# Patient Record
Sex: FEMALE | Race: WHITE | NOT HISPANIC OR LATINO | Employment: STUDENT | URBAN - METROPOLITAN AREA
[De-identification: names, ages, dates, MRNs, and addresses within clinical notes are randomized per-mention and may not be internally consistent; named-entity substitution may affect disease eponyms.]

---
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
## 2021-06-17 ENCOUNTER — PATIENT MESSAGE (OUTPATIENT)
Dept: PEDIATRIC CARDIOLOGY | Facility: CLINIC | Age: 21
End: 2021-06-17

## 2021-06-17 DIAGNOSIS — Q21.3 TETRALOGY OF FALLOT: Primary | ICD-10-CM

## 2021-07-26 ENCOUNTER — HOSPITAL ENCOUNTER (OUTPATIENT)
Dept: PEDIATRIC CARDIOLOGY | Facility: HOSPITAL | Age: 21
Discharge: HOME | End: 2021-07-26
Attending: PEDIATRICS
Payer: COMMERCIAL

## 2021-07-26 ENCOUNTER — CLINICAL SUPPORT (OUTPATIENT)
Dept: PEDIATRIC CARDIOLOGY | Facility: CLINIC | Age: 21
End: 2021-07-26
Payer: COMMERCIAL

## 2021-07-26 ENCOUNTER — OFFICE VISIT (OUTPATIENT)
Dept: PEDIATRIC CARDIOLOGY | Facility: CLINIC | Age: 21
End: 2021-07-26
Payer: COMMERCIAL

## 2021-07-26 VITALS
DIASTOLIC BLOOD PRESSURE: 66 MMHG | HEART RATE: 69 BPM | OXYGEN SATURATION: 98 % | BODY MASS INDEX: 22.68 KG/M2 | SYSTOLIC BLOOD PRESSURE: 132 MMHG | HEIGHT: 60 IN | WEIGHT: 115.5 LBS

## 2021-07-26 DIAGNOSIS — Z95.2 S/P PULMONARY VALVE REPLACEMENT: ICD-10-CM

## 2021-07-26 DIAGNOSIS — Q21.3 TETRALOGY OF FALLOT: ICD-10-CM

## 2021-07-26 DIAGNOSIS — I45.10 RBBB: Primary | ICD-10-CM

## 2021-07-26 DIAGNOSIS — Z87.74 S/P TOF (TETRALOGY OF FALLOT) REPAIR: ICD-10-CM

## 2021-07-26 PROCEDURE — 93320 PR DOPPLER ECHO HEART,COMPLETE: ICD-10-PCS | Mod: 26,,, | Performed by: PEDIATRICS

## 2021-07-26 PROCEDURE — 93303 PR ECHO XTHORACIC,CONG A2M,COMPLETE: ICD-10-PCS | Mod: 26,,, | Performed by: PEDIATRICS

## 2021-07-26 PROCEDURE — 1126F AMNT PAIN NOTED NONE PRSNT: CPT | Mod: CPTII,S$GLB,, | Performed by: PEDIATRICS

## 2021-07-26 PROCEDURE — 93242 EXT ECG>48HR<7D RECORDING: CPT

## 2021-07-26 PROCEDURE — 99215 PR OFFICE/OUTPT VISIT, EST, LEVL V, 40-54 MIN: ICD-10-PCS | Mod: 25,S$GLB,, | Performed by: PEDIATRICS

## 2021-07-26 PROCEDURE — 93325 PR DOPPLER COLOR FLOW VELOCITY MAP: ICD-10-PCS | Mod: 26,,, | Performed by: PEDIATRICS

## 2021-07-26 PROCEDURE — 1159F PR MEDICATION LIST DOCUMENTED IN MEDICAL RECORD: ICD-10-PCS | Mod: CPTII,S$GLB,, | Performed by: PEDIATRICS

## 2021-07-26 PROCEDURE — 93000 EKG 12-LEAD PEDIATRIC: ICD-10-PCS | Mod: S$GLB,,, | Performed by: PEDIATRICS

## 2021-07-26 PROCEDURE — 93000 ELECTROCARDIOGRAM COMPLETE: CPT | Mod: S$GLB,,, | Performed by: PEDIATRICS

## 2021-07-26 PROCEDURE — 93325 DOPPLER ECHO COLOR FLOW MAPG: CPT | Mod: 26,,, | Performed by: PEDIATRICS

## 2021-07-26 PROCEDURE — 93320 DOPPLER ECHO COMPLETE: CPT | Mod: 26,,, | Performed by: PEDIATRICS

## 2021-07-26 PROCEDURE — 1126F PR PAIN SEVERITY QUANTIFIED, NO PAIN PRESENT: ICD-10-PCS | Mod: CPTII,S$GLB,, | Performed by: PEDIATRICS

## 2021-07-26 PROCEDURE — 93303 ECHO TRANSTHORACIC: CPT | Mod: 26,,, | Performed by: PEDIATRICS

## 2021-07-26 PROCEDURE — 93244 CV 3-14 DAY PEDIATRIC HOLTER MONITOR (CUPID ONLY): ICD-10-PCS | Mod: ,,, | Performed by: PEDIATRICS

## 2021-07-26 PROCEDURE — 1159F MED LIST DOCD IN RCRD: CPT | Mod: CPTII,S$GLB,, | Performed by: PEDIATRICS

## 2021-07-26 PROCEDURE — 99999 PR PBB SHADOW E&M-EST. PATIENT-LVL III: ICD-10-PCS | Mod: PBBFAC,,, | Performed by: PEDIATRICS

## 2021-07-26 PROCEDURE — 93244 EXT ECG>48HR<7D REV&INTERPJ: CPT | Mod: ,,, | Performed by: PEDIATRICS

## 2021-07-26 PROCEDURE — 99999 PR PBB SHADOW E&M-EST. PATIENT-LVL III: CPT | Mod: PBBFAC,,, | Performed by: PEDIATRICS

## 2021-07-26 PROCEDURE — 3008F PR BODY MASS INDEX (BMI) DOCUMENTED: ICD-10-PCS | Mod: CPTII,S$GLB,, | Performed by: PEDIATRICS

## 2021-07-26 PROCEDURE — 1160F PR REVIEW ALL MEDS BY PRESCRIBER/CLIN PHARMACIST DOCUMENTED: ICD-10-PCS | Mod: CPTII,S$GLB,, | Performed by: PEDIATRICS

## 2021-07-26 PROCEDURE — 3008F BODY MASS INDEX DOCD: CPT | Mod: CPTII,S$GLB,, | Performed by: PEDIATRICS

## 2021-07-26 PROCEDURE — 99215 OFFICE O/P EST HI 40 MIN: CPT | Mod: 25,S$GLB,, | Performed by: PEDIATRICS

## 2021-07-26 PROCEDURE — 1160F RVW MEDS BY RX/DR IN RCRD: CPT | Mod: CPTII,S$GLB,, | Performed by: PEDIATRICS

## 2021-07-26 RX ORDER — CEFUROXIME AXETIL 500 MG/1
1 TABLET ORAL 2 TIMES DAILY
COMMUNITY
Start: 2021-07-21 | End: 2021-07-31

## 2022-06-07 ENCOUNTER — TELEPHONE (OUTPATIENT)
Dept: PEDIATRIC CARDIOLOGY | Facility: CLINIC | Age: 22
End: 2022-06-07
Payer: COMMERCIAL

## 2022-06-07 DIAGNOSIS — Z87.74 S/P TOF (TETRALOGY OF FALLOT) REPAIR: Primary | ICD-10-CM

## 2022-06-07 DIAGNOSIS — Z95.2 S/P PULMONARY VALVE REPLACEMENT: ICD-10-CM

## 2022-06-07 NOTE — TELEPHONE ENCOUNTER
----- Message from Maribeth Simms sent at 6/7/2022 10:19 AM CDT -----  Contact: [phone]    Caller: Mom    Reason : She is requesting a call back from the nurse to get assistance with  scheduling an     appointment.    Comments: Please call mom back to advise.

## 2022-06-07 NOTE — TELEPHONE ENCOUNTER
Spoke to mother regarding scheduling appointments for yearly check up. Mother in agreement for appointments on Monday July 13, starting at 10:15A. Appointment slip to be mailed to address on file.

## 2022-07-18 ENCOUNTER — HOSPITAL ENCOUNTER (OUTPATIENT)
Dept: PEDIATRIC CARDIOLOGY | Facility: HOSPITAL | Age: 22
Discharge: HOME | End: 2022-07-18
Attending: PEDIATRICS
Payer: COMMERCIAL

## 2022-07-18 ENCOUNTER — CLINICAL SUPPORT (OUTPATIENT)
Dept: PEDIATRIC CARDIOLOGY | Facility: CLINIC | Age: 22
End: 2022-07-18
Payer: COMMERCIAL

## 2022-07-18 ENCOUNTER — OFFICE VISIT (OUTPATIENT)
Dept: PEDIATRIC CARDIOLOGY | Facility: CLINIC | Age: 22
End: 2022-07-18
Payer: COMMERCIAL

## 2022-07-18 VITALS
WEIGHT: 123.44 LBS | DIASTOLIC BLOOD PRESSURE: 72 MMHG | OXYGEN SATURATION: 98 % | SYSTOLIC BLOOD PRESSURE: 126 MMHG | HEART RATE: 77 BPM | BODY MASS INDEX: 24.88 KG/M2 | HEIGHT: 59 IN

## 2022-07-18 DIAGNOSIS — Z87.74 S/P TOF (TETRALOGY OF FALLOT) REPAIR: ICD-10-CM

## 2022-07-18 DIAGNOSIS — Z95.2 S/P PULMONARY VALVE REPLACEMENT: ICD-10-CM

## 2022-07-18 DIAGNOSIS — I45.10 RBBB: ICD-10-CM

## 2022-07-18 DIAGNOSIS — Z98.890 STATUS POST PULMONARY ARTERY BRANCHES STENT PLACEMENT: ICD-10-CM

## 2022-07-18 DIAGNOSIS — Z87.74 S/P TOF (TETRALOGY OF FALLOT) REPAIR: Primary | ICD-10-CM

## 2022-07-18 PROBLEM — R53.81 PHYSICAL DECONDITIONING: Status: RESOLVED | Noted: 2018-05-08 | Resolved: 2022-07-18

## 2022-07-18 PROCEDURE — 93244 CV 3-14 DAY PEDIATRIC HOLTER MONITOR (CUPID ONLY): ICD-10-PCS | Mod: ,,, | Performed by: PEDIATRICS

## 2022-07-18 PROCEDURE — 93320 PEDIATRIC ECHO (CUPID ONLY): ICD-10-PCS | Mod: 26,,, | Performed by: PEDIATRICS

## 2022-07-18 PROCEDURE — 93325 DOPPLER ECHO COLOR FLOW MAPG: CPT | Mod: 26,,, | Performed by: PEDIATRICS

## 2022-07-18 PROCEDURE — 1159F MED LIST DOCD IN RCRD: CPT | Mod: CPTII,S$GLB,, | Performed by: PEDIATRICS

## 2022-07-18 PROCEDURE — 93320 DOPPLER ECHO COMPLETE: CPT | Mod: 26,,, | Performed by: PEDIATRICS

## 2022-07-18 PROCEDURE — 99999 PR PBB SHADOW E&M-EST. PATIENT-LVL I: CPT | Mod: PBBFAC,,,

## 2022-07-18 PROCEDURE — 93303 PEDIATRIC ECHO (CUPID ONLY): ICD-10-PCS | Mod: 26,,, | Performed by: PEDIATRICS

## 2022-07-18 PROCEDURE — 93000 ELECTROCARDIOGRAM COMPLETE: CPT | Mod: S$GLB,,, | Performed by: PEDIATRICS

## 2022-07-18 PROCEDURE — 93325 DOPPLER ECHO COLOR FLOW MAPG: CPT

## 2022-07-18 PROCEDURE — 99999 PR PBB SHADOW E&M-EST. PATIENT-LVL III: CPT | Mod: PBBFAC,,, | Performed by: PEDIATRICS

## 2022-07-18 PROCEDURE — 3008F BODY MASS INDEX DOCD: CPT | Mod: CPTII,S$GLB,, | Performed by: PEDIATRICS

## 2022-07-18 PROCEDURE — 93303 ECHO TRANSTHORACIC: CPT | Mod: 26,,, | Performed by: PEDIATRICS

## 2022-07-18 PROCEDURE — 99999 PR PBB SHADOW E&M-EST. PATIENT-LVL III: ICD-10-PCS | Mod: PBBFAC,,, | Performed by: PEDIATRICS

## 2022-07-18 PROCEDURE — 93000 EKG 12-LEAD PEDIATRIC: ICD-10-PCS | Mod: S$GLB,,, | Performed by: PEDIATRICS

## 2022-07-18 PROCEDURE — 3078F DIAST BP <80 MM HG: CPT | Mod: CPTII,S$GLB,, | Performed by: PEDIATRICS

## 2022-07-18 PROCEDURE — 99999 PR PBB SHADOW E&M-EST. PATIENT-LVL I: ICD-10-PCS | Mod: PBBFAC,,,

## 2022-07-18 PROCEDURE — 3078F PR MOST RECENT DIASTOLIC BLOOD PRESSURE < 80 MM HG: ICD-10-PCS | Mod: CPTII,S$GLB,, | Performed by: PEDIATRICS

## 2022-07-18 PROCEDURE — 3074F SYST BP LT 130 MM HG: CPT | Mod: CPTII,S$GLB,, | Performed by: PEDIATRICS

## 2022-07-18 PROCEDURE — 1160F RVW MEDS BY RX/DR IN RCRD: CPT | Mod: CPTII,S$GLB,, | Performed by: PEDIATRICS

## 2022-07-18 PROCEDURE — 93325 PEDIATRIC ECHO (CUPID ONLY): ICD-10-PCS | Mod: 26,,, | Performed by: PEDIATRICS

## 2022-07-18 PROCEDURE — 99215 OFFICE O/P EST HI 40 MIN: CPT | Mod: 25,S$GLB,, | Performed by: PEDIATRICS

## 2022-07-18 PROCEDURE — 3008F PR BODY MASS INDEX (BMI) DOCUMENTED: ICD-10-PCS | Mod: CPTII,S$GLB,, | Performed by: PEDIATRICS

## 2022-07-18 PROCEDURE — 99215 PR OFFICE/OUTPT VISIT, EST, LEVL V, 40-54 MIN: ICD-10-PCS | Mod: 25,S$GLB,, | Performed by: PEDIATRICS

## 2022-07-18 PROCEDURE — 93242 EXT ECG>48HR<7D RECORDING: CPT

## 2022-07-18 PROCEDURE — 3074F PR MOST RECENT SYSTOLIC BLOOD PRESSURE < 130 MM HG: ICD-10-PCS | Mod: CPTII,S$GLB,, | Performed by: PEDIATRICS

## 2022-07-18 PROCEDURE — 1160F PR REVIEW ALL MEDS BY PRESCRIBER/CLIN PHARMACIST DOCUMENTED: ICD-10-PCS | Mod: CPTII,S$GLB,, | Performed by: PEDIATRICS

## 2022-07-18 PROCEDURE — 1159F PR MEDICATION LIST DOCUMENTED IN MEDICAL RECORD: ICD-10-PCS | Mod: CPTII,S$GLB,, | Performed by: PEDIATRICS

## 2022-07-18 PROCEDURE — 93244 EXT ECG>48HR<7D REV&INTERPJ: CPT | Mod: ,,, | Performed by: PEDIATRICS

## 2022-07-18 NOTE — PROGRESS NOTES
Subjective:    Patient ID:  Jeremias Cosme is a 21 y.o. female who presents for follow-up of repaired TOF. She has no cardiac symptoms.     She takes aspirin and OCP daily.    She had a recent influenza B infection. She has been on a cephalosporin antibiotic for 5 days for sinusitis symptoms. She feels well now.    PRIOR CARDIAC INTERVENTIONS:   1. Modified right Miguelangel-Taussig shunt, [facility]NO ([facility])  2. TOF complete repair/ trans-annular patch, [facility] ([facility])  3. Right and left branch pulmonary artery stent placement, [facility] (03/21/2005, 09/13/2004).   4. Pulmonary valve replacement using 23 mm Trifecta bovine pericardial valve, branch pulmonary arterioplasty with homograft, main pulmonary arterioplasty with bovine pericardium, partial removal of bilateral pulmonary stents (July 13, 2011) OF ([facility])  5. Chandrika valve implant 2015 ([facility])      Review of Systems   Constitutional: Negative.   HENT: Negative.    Eyes: Negative.    Cardiovascular: Negative.    Respiratory: Negative.    Endocrine: Negative.    Hematologic/Lymphatic: Negative.    Skin: Negative.    Musculoskeletal: Negative.    Gastrointestinal: Negative.    Genitourinary: Negative.    Neurological: Negative.    Psychiatric/Behavioral: Negative.    Allergic/Immunologic: Negative.         Objective:    Physical Exam  Constitutional:       General: She is not in acute distress.     Appearance: She is well-developed. She is not diaphoretic.   HENT:      Head: Normocephalic and atraumatic.      Right Ear: External ear normal.      Left Ear: External ear normal.      Nose: Nose normal.      Mouth/Throat:      Pharynx: No oropharyngeal exudate.   Eyes:      General: No scleral icterus.        Right eye: No discharge.         Left eye: No discharge.      Conjunctiva/sclera: Conjunctivae normal.      Pupils: Pupils are equal, round, and reactive to light.   Neck:      Thyroid: No thyromegaly.      Vascular: No JVD.      Trachea: No tracheal deviation.    Cardiovascular:      Rate and Rhythm: Normal rate.      Pulses: Intact distal pulses.           Radial pulses are 2+ on the right side and 2+ on the left side.        Femoral pulses are 2+ on the right side and 2+ on the left side.     Heart sounds: S1 normal and S2 normal. Murmur heard.    Medium-pitched harsh early systolic murmur is present with a grade of 2/6 at the upper left sternal border.    No friction rub. No gallop.   Pulmonary:      Effort: Pulmonary effort is normal. No respiratory distress.      Breath sounds: Normal breath sounds. No stridor. No wheezing or rales.   Chest:      Chest wall: No tenderness.   Abdominal:      General: Bowel sounds are normal. There is no distension.      Palpations: Abdomen is soft. There is no mass.      Tenderness: There is no abdominal tenderness. There is no guarding or rebound.   Musculoskeletal:         General: No tenderness. Normal range of motion.      Cervical back: Normal range of motion and neck supple.   Lymphadenopathy:      Cervical: No cervical adenopathy.   Skin:     General: Skin is warm and dry.      Coloration: Skin is not pale.      Findings: No erythema or rash.   Neurological:      Mental Status: She is alert and oriented to person, place, and time.      Cranial Nerves: No cranial nerve deficit.      Motor: No abnormal muscle tone.      Coordination: Coordination normal.   Psychiatric:         Behavior: Behavior normal.         Thought Content: Thought content normal.         Judgment: Judgment normal.           ECG: NSR, RBBB  ECHO: Unchanged. Chandrika functioning well, no insufficiency or significant stenosis. Minimal branch gradient unchanged. Mild RVE an low normal biventricular function unchanged.    Assessment:       1. S/P TOF (tetralogy of Fallot) repair    2. Status post pulmonary artery branches stent placement and partial extraction    3. S/P Chandrika in bioprosthetic pulmonary valve replacement    4. RBBB             Plan:       1. I  reviewed today's findings in detail including the need for regular exercise and for continued follow up, awareness of the risk for endocarditis and the eventual need for pulmonary valve re-intervention when the bioprosthetic valve fails. Anticipatory guidance regarding adult issues including pregnancy planning reviewed.  2. Continue aspirin 81 mg po daily and SBE precaution prn.  3. Treat as normal from a cardiac standpoint. Encouraged exercise.  4. Holter today, phone follow up  5. Recheck in one year with ECG, Holter and echo.

## 2022-07-27 LAB
OHS CV EVENT MONITOR DAY: 1
OHS CV HOLTER HOOKUP DATE: NORMAL
OHS CV HOLTER HOOKUP TIME: NORMAL
OHS CV HOLTER LENGTH DECIMAL HOURS: 25
OHS CV HOLTER LENGTH HOURS: 1
OHS CV HOLTER LENGTH MINUTES: 0
OHS CV HOLTER SCAN DATE: NORMAL
OHS CV HOLTER SINUS AVERAGE HR: 77 BPM
OHS CV HOLTER SINUS MAX HR: 124 BPM
OHS CV HOLTER SINUS MIN HR: 51 BPM
OHS CV HOLTER STUDY END DATE: NORMAL
OHS CV HOLTER STUDY END TIME: NORMAL

## 2023-06-26 DIAGNOSIS — Z95.2 S/P PULMONARY VALVE REPLACEMENT: ICD-10-CM

## 2023-06-26 DIAGNOSIS — Z98.890 STATUS POST PULMONARY ARTERY BRANCHES STENT PLACEMENT: ICD-10-CM

## 2023-06-26 DIAGNOSIS — Z87.74 S/P TOF (TETRALOGY OF FALLOT) REPAIR: Primary | ICD-10-CM

## 2023-07-17 ENCOUNTER — HOSPITAL ENCOUNTER (OUTPATIENT)
Dept: PEDIATRIC CARDIOLOGY | Facility: HOSPITAL | Age: 23
Discharge: HOME | End: 2023-07-17
Attending: PEDIATRICS
Payer: COMMERCIAL

## 2023-07-17 ENCOUNTER — CLINICAL SUPPORT (OUTPATIENT)
Dept: PEDIATRIC CARDIOLOGY | Facility: CLINIC | Age: 23
End: 2023-07-17
Payer: COMMERCIAL

## 2023-07-17 ENCOUNTER — OFFICE VISIT (OUTPATIENT)
Dept: PEDIATRIC CARDIOLOGY | Facility: CLINIC | Age: 23
End: 2023-07-17
Payer: COMMERCIAL

## 2023-07-17 VITALS
HEART RATE: 86 BPM | DIASTOLIC BLOOD PRESSURE: 72 MMHG | SYSTOLIC BLOOD PRESSURE: 133 MMHG | HEIGHT: 60 IN | OXYGEN SATURATION: 98 % | WEIGHT: 128.5 LBS | BODY MASS INDEX: 25.23 KG/M2

## 2023-07-17 DIAGNOSIS — Z95.2 S/P PULMONARY VALVE REPLACEMENT: ICD-10-CM

## 2023-07-17 DIAGNOSIS — Z87.74 S/P TOF (TETRALOGY OF FALLOT) REPAIR: ICD-10-CM

## 2023-07-17 DIAGNOSIS — I45.10 RBBB: ICD-10-CM

## 2023-07-17 DIAGNOSIS — Z98.890 STATUS POST PULMONARY ARTERY BRANCHES STENT PLACEMENT: ICD-10-CM

## 2023-07-17 DIAGNOSIS — Q21.3 TETRALOGY OF FALLOT: Primary | ICD-10-CM

## 2023-07-17 LAB — BSA FOR ECHO PROCEDURE: 1.57 M2

## 2023-07-17 PROCEDURE — 99215 OFFICE O/P EST HI 40 MIN: CPT | Mod: 25,S$GLB,, | Performed by: PEDIATRICS

## 2023-07-17 PROCEDURE — 93325 DOPPLER ECHO COLOR FLOW MAPG: CPT | Mod: 26,,, | Performed by: PEDIATRICS

## 2023-07-17 PROCEDURE — 93303 PEDIATRIC ECHO (CUPID ONLY): ICD-10-PCS | Mod: 26,,, | Performed by: PEDIATRICS

## 2023-07-17 PROCEDURE — 3078F DIAST BP <80 MM HG: CPT | Mod: CPTII,S$GLB,, | Performed by: PEDIATRICS

## 2023-07-17 PROCEDURE — 1160F RVW MEDS BY RX/DR IN RCRD: CPT | Mod: CPTII,S$GLB,, | Performed by: PEDIATRICS

## 2023-07-17 PROCEDURE — 1159F PR MEDICATION LIST DOCUMENTED IN MEDICAL RECORD: ICD-10-PCS | Mod: CPTII,S$GLB,, | Performed by: PEDIATRICS

## 2023-07-17 PROCEDURE — 93244 CV 3-14 DAY PEDIATRIC HOLTER MONITOR (CUPID ONLY): ICD-10-PCS | Mod: ,,, | Performed by: PEDIATRICS

## 2023-07-17 PROCEDURE — 3008F PR BODY MASS INDEX (BMI) DOCUMENTED: ICD-10-PCS | Mod: CPTII,S$GLB,, | Performed by: PEDIATRICS

## 2023-07-17 PROCEDURE — 3075F SYST BP GE 130 - 139MM HG: CPT | Mod: CPTII,S$GLB,, | Performed by: PEDIATRICS

## 2023-07-17 PROCEDURE — 99215 PR OFFICE/OUTPT VISIT, EST, LEVL V, 40-54 MIN: ICD-10-PCS | Mod: 25,S$GLB,, | Performed by: PEDIATRICS

## 2023-07-17 PROCEDURE — 99999 PR PBB SHADOW E&M-EST. PATIENT-LVL III: CPT | Mod: PBBFAC,,, | Performed by: PEDIATRICS

## 2023-07-17 PROCEDURE — 93320 PEDIATRIC ECHO (CUPID ONLY): ICD-10-PCS | Mod: 26,,, | Performed by: PEDIATRICS

## 2023-07-17 PROCEDURE — 93325 DOPPLER ECHO COLOR FLOW MAPG: CPT

## 2023-07-17 PROCEDURE — 93244 EXT ECG>48HR<7D REV&INTERPJ: CPT | Mod: ,,, | Performed by: PEDIATRICS

## 2023-07-17 PROCEDURE — 93242 EXT ECG>48HR<7D RECORDING: CPT

## 2023-07-17 PROCEDURE — 3075F PR MOST RECENT SYSTOLIC BLOOD PRESS GE 130-139MM HG: ICD-10-PCS | Mod: CPTII,S$GLB,, | Performed by: PEDIATRICS

## 2023-07-17 PROCEDURE — 93325 PEDIATRIC ECHO (CUPID ONLY): ICD-10-PCS | Mod: 26,,, | Performed by: PEDIATRICS

## 2023-07-17 PROCEDURE — 99999 PR PBB SHADOW E&M-EST. PATIENT-LVL I: CPT | Mod: PBBFAC,,,

## 2023-07-17 PROCEDURE — 3008F BODY MASS INDEX DOCD: CPT | Mod: CPTII,S$GLB,, | Performed by: PEDIATRICS

## 2023-07-17 PROCEDURE — 99999 PR PBB SHADOW E&M-EST. PATIENT-LVL III: ICD-10-PCS | Mod: PBBFAC,,, | Performed by: PEDIATRICS

## 2023-07-17 PROCEDURE — 1160F PR REVIEW ALL MEDS BY PRESCRIBER/CLIN PHARMACIST DOCUMENTED: ICD-10-PCS | Mod: CPTII,S$GLB,, | Performed by: PEDIATRICS

## 2023-07-17 PROCEDURE — 3078F PR MOST RECENT DIASTOLIC BLOOD PRESSURE < 80 MM HG: ICD-10-PCS | Mod: CPTII,S$GLB,, | Performed by: PEDIATRICS

## 2023-07-17 PROCEDURE — 93000 EKG 12-LEAD PEDIATRIC: ICD-10-PCS | Mod: S$GLB,,, | Performed by: PEDIATRICS

## 2023-07-17 PROCEDURE — 99999 PR PBB SHADOW E&M-EST. PATIENT-LVL I: ICD-10-PCS | Mod: PBBFAC,,,

## 2023-07-17 PROCEDURE — 93320 DOPPLER ECHO COMPLETE: CPT | Mod: 26,,, | Performed by: PEDIATRICS

## 2023-07-17 PROCEDURE — 93000 ELECTROCARDIOGRAM COMPLETE: CPT | Mod: S$GLB,,, | Performed by: PEDIATRICS

## 2023-07-17 PROCEDURE — 1159F MED LIST DOCD IN RCRD: CPT | Mod: CPTII,S$GLB,, | Performed by: PEDIATRICS

## 2023-07-17 PROCEDURE — 93303 ECHO TRANSTHORACIC: CPT | Mod: 26,,, | Performed by: PEDIATRICS

## 2023-07-17 NOTE — PROGRESS NOTES
Subjective:    Patient ID:  Jeremias Cosme is a 22 y.o. female who presents for follow-up of repaired TOF. She has no cardiac symptoms.     She takes aspirin and OCP daily.    PRIOR CARDIAC INTERVENTIONS:   1. Modified right Miguelangel-Taussig shunt, CHNO ([facility])  2. TOF complete repair/ trans-annular patch, [facility]NO ([facility])  3. Right and left branch pulmonary artery stent placement, [facility] (03/21/2005, 09/13/2004).   4. Pulmonary valve replacement using 23 mm Trifecta bovine pericardial valve, branch pulmonary arterioplasty with homograft, main pulmonary arterioplasty with bovine pericardium, partial removal of bilateral pulmonary stents (July 13, 2011) OF ([facility])  5. Chandrika valve implant 2015 ([facility])      Review of Systems   Constitutional: Negative.   HENT: Negative.     Eyes: Negative.    Cardiovascular: Negative.    Respiratory: Negative.     Endocrine: Negative.    Hematologic/Lymphatic: Negative.    Skin: Negative.    Musculoskeletal: Negative.    Gastrointestinal: Negative.    Genitourinary: Negative.    Neurological: Negative.    Psychiatric/Behavioral: Negative.     Allergic/Immunologic: Negative.       Objective:    Physical Exam  Constitutional:       General: She is not in acute distress.     Appearance: She is well-developed. She is not diaphoretic.   HENT:      Head: Normocephalic and atraumatic.      Right Ear: External ear normal.      Left Ear: External ear normal.      Nose: Nose normal.      Mouth/Throat:      Pharynx: No oropharyngeal exudate.   Eyes:      General: No scleral icterus.        Right eye: No discharge.         Left eye: No discharge.      Conjunctiva/sclera: Conjunctivae normal.      Pupils: Pupils are equal, round, and reactive to light.   Neck:      Thyroid: No thyromegaly.      Vascular: No JVD.      Trachea: No tracheal deviation.   Cardiovascular:      Rate and Rhythm: Normal rate.      Pulses: Intact distal pulses.           Radial pulses are 2+ on the right side and 2+ on the  left side.        Femoral pulses are 2+ on the right side and 2+ on the left side.     Heart sounds: S1 normal and S2 normal. Murmur heard.   Medium-pitched harsh early systolic murmur is present with a grade of 2/6 at the upper left sternal border.     No friction rub. No gallop.   Pulmonary:      Effort: Pulmonary effort is normal. No respiratory distress.      Breath sounds: Normal breath sounds. No stridor. No wheezing or rales.   Chest:      Chest wall: No tenderness.   Abdominal:      General: Bowel sounds are normal. There is no distension.      Palpations: Abdomen is soft. There is no mass.      Tenderness: There is no abdominal tenderness. There is no guarding or rebound.   Musculoskeletal:         General: No tenderness. Normal range of motion.      Cervical back: Normal range of motion and neck supple.   Lymphadenopathy:      Cervical: No cervical adenopathy.   Skin:     General: Skin is warm and dry.      Coloration: Skin is not pale.      Findings: No erythema or rash.   Neurological:      Mental Status: She is alert and oriented to person, place, and time.      Cranial Nerves: No cranial nerve deficit.      Motor: No abnormal muscle tone.      Coordination: Coordination normal.   Psychiatric:         Behavior: Behavior normal.         Thought Content: Thought content normal.         Judgment: Judgment normal.         ECG: NSR, RBBB  ECHO: Chandrika functioning well, no insufficiency or significant stenosis (minor increase in peak gradient to 25 mmHg). Minimal branch gradient unchanged. Mild RVE an low normal biventricular function unchanged.    Assessment:       1. S/P TOF (tetralogy of Fallot) repair    2. Status post pulmonary artery branches stent placement and partial extraction    3. S/P Chandrika in bioprosthetic pulmonary valve replacement    4. RBBB             Plan:       1. I reviewed today's findings in detail including the need for regular exercise and for continued follow up, awareness of the  risk for endocarditis and the eventual need for pulmonary valve re-intervention when the bioprosthetic valve fails. Anticipatory guidance regarding adult issues including pregnancy planning reviewed.  2. Continue aspirin 81 mg po daily and SBE precaution prn.  3. Treat as normal from a cardiac standpoint. Encouraged exercise.  4. Holter today, phone follow up  5. Recheck in one year with ECG, Holter and echo.

## 2023-08-07 LAB
OHS CV EVENT MONITOR DAY: 1
OHS CV HOLTER HOOKUP DATE: NORMAL
OHS CV HOLTER HOOKUP TIME: NORMAL
OHS CV HOLTER LENGTH DECIMAL HOURS: 24
OHS CV HOLTER LENGTH HOURS: 0
OHS CV HOLTER LENGTH MINUTES: 0
OHS CV HOLTER SCAN DATE: NORMAL
OHS CV HOLTER SINUS AVERAGE HR: 78 BPM
OHS CV HOLTER SINUS MAX HR: 131 BPM
OHS CV HOLTER SINUS MIN HR: 31 BPM
OHS CV HOLTER STUDY END DATE: NORMAL
OHS CV HOLTER STUDY END TIME: NORMAL

## 2024-06-20 ENCOUNTER — PATIENT MESSAGE (OUTPATIENT)
Dept: CARDIOLOGY | Facility: CLINIC | Age: 24
End: 2024-06-20
Payer: COMMERCIAL

## 2024-06-20 DIAGNOSIS — Z95.2 S/P PULMONARY VALVE REPLACEMENT: ICD-10-CM

## 2024-06-20 DIAGNOSIS — Z87.74 S/P TOF (TETRALOGY OF FALLOT) REPAIR: Primary | ICD-10-CM

## 2024-06-20 DIAGNOSIS — Z98.890 STATUS POST PULMONARY ARTERY BRANCHES STENT PLACEMENT: ICD-10-CM

## 2024-06-20 DIAGNOSIS — Q21.3 TETRALOGY OF FALLOT: ICD-10-CM

## 2024-06-24 ENCOUNTER — TELEPHONE (OUTPATIENT)
Dept: CARDIOLOGY | Facility: CLINIC | Age: 24
End: 2024-06-24
Payer: COMMERCIAL

## 2024-06-24 NOTE — TELEPHONE ENCOUNTER
"I have added the echo, EKG and holter to your appointment with Dr. De Jesus on 8/28 new start time 11 AM. Patient verbalized understanding all information provided   ----- Message from Alley Guadarrama RN sent at 6/19/2024 12:00 PM CDT -----  Contact: Jeremias @ [phone]    ----- Message -----  From: Betsy Forrester MA  Sent: 6/19/2024  11:39 AM CDT  To: Neal Cordero "[facility]" Staff    The patient calling to get EKG, Echo and Heart Monitor appointment scheduled with appointment she scheduled on 08/28. Please give her a call back at [phone].  "

## 2024-07-01 ENCOUNTER — TELEPHONE (OUTPATIENT)
Dept: PEDIATRIC CARDIOLOGY | Facility: CLINIC | Age: 24
End: 2024-07-01
Payer: COMMERCIAL

## 2024-07-01 NOTE — TELEPHONE ENCOUNTER
Pt went to ED for chest/back pain. CT and xray normal. Mom does not think symptoms are cardiac related; okay with waiting until August for scheduled appt with Dr. DeJ esus. Encouraged to call back with any concerns and will try to get in sooner.     ----- Message from Malena Mart sent at 7/1/2024 12:52 PM CDT -----  Contact: Pt [phone]  Would like to receive medical advice.  Would they like a call back or a response via [facility]:  Call Back  Additional information:      The pt is needing to schedule a hospital follow up. She should be discharged from [facility] on today.

## 2024-08-28 ENCOUNTER — OFFICE VISIT (OUTPATIENT)
Dept: CARDIOLOGY | Facility: CLINIC | Age: 24
End: 2024-08-28
Payer: COMMERCIAL

## 2024-08-28 ENCOUNTER — HOSPITAL ENCOUNTER (OUTPATIENT)
Dept: PEDIATRIC CARDIOLOGY | Facility: HOSPITAL | Age: 24
Discharge: HOME | End: 2024-08-28
Attending: PEDIATRICS
Payer: COMMERCIAL

## 2024-08-28 ENCOUNTER — HOSPITAL ENCOUNTER (OUTPATIENT)
Dept: CARDIOLOGY | Facility: CLINIC | Age: 24
Discharge: HOME | End: 2024-08-28
Payer: COMMERCIAL

## 2024-08-28 ENCOUNTER — HOSPITAL ENCOUNTER (OUTPATIENT)
Dept: CARDIOLOGY | Facility: HOSPITAL | Age: 24
Discharge: HOME | End: 2024-08-28
Attending: PEDIATRICS
Payer: COMMERCIAL

## 2024-08-28 VITALS — HEIGHT: 59 IN | BODY MASS INDEX: 25.8 KG/M2 | WEIGHT: 128 LBS

## 2024-08-28 VITALS
DIASTOLIC BLOOD PRESSURE: 76 MMHG | HEIGHT: 60 IN | WEIGHT: 137.38 LBS | OXYGEN SATURATION: 96 % | SYSTOLIC BLOOD PRESSURE: 115 MMHG | HEART RATE: 71 BPM | BODY MASS INDEX: 26.97 KG/M2

## 2024-08-28 DIAGNOSIS — Z87.74 S/P TOF (TETRALOGY OF FALLOT) REPAIR: ICD-10-CM

## 2024-08-28 DIAGNOSIS — Z98.890 STATUS POST PULMONARY ARTERY BRANCHES STENT PLACEMENT: ICD-10-CM

## 2024-08-28 DIAGNOSIS — Q21.3 TETRALOGY OF FALLOT: ICD-10-CM

## 2024-08-28 DIAGNOSIS — Z87.74 S/P TOF (TETRALOGY OF FALLOT) REPAIR: Primary | ICD-10-CM

## 2024-08-28 DIAGNOSIS — Z95.2 S/P PULMONARY VALVE REPLACEMENT: ICD-10-CM

## 2024-08-28 DIAGNOSIS — I45.10 RBBB: ICD-10-CM

## 2024-08-28 LAB
ASCENDING AORTA: 2.75 CM
AV INDEX (PROSTH): 0.99
AV MEAN GRADIENT: 2 MMHG
AV PEAK GRADIENT: 4 MMHG
AV VALVE AREA BY VELOCITY RATIO: 3.44 CM²
AV VALVE AREA: 3.72 CM²
AV VELOCITY RATIO: 0.91
BSA FOR ECHO PROCEDURE: 1.55 M2
CV ECHO LV RWT: 0.44 CM
DOP CALC AO PEAK VEL: 1.05 M/S
DOP CALC AO VTI: 21.02 CM
DOP CALC LVOT AREA: 3.8 CM2
DOP CALC LVOT DIAMETER: 2.19 CM
DOP CALC LVOT PEAK VEL: 0.96 M/S
DOP CALC LVOT STROKE VOLUME: 78.27 CM3
DOP CALC RVOT PEAK VEL: 1.04 M/S
DOP CALC RVOT VTI: 23.21 CM
DOP CALCLVOT PEAK VEL VTI: 20.79 CM
E WAVE DECELERATION TIME: 295.58 MSEC
E/A RATIO: 2.07
E/E' RATIO: 10.56 M/S
ECHO LV POSTERIOR WALL: 0.79 CM (ref 0.6–1.1)
FRACTIONAL SHORTENING: 33 % (ref 28–44)
INTERVENTRICULAR SEPTUM: 0.69 CM (ref 0.6–1.1)
LA MAJOR: 4.43 CM
LA MINOR: 4.21 CM
LA WIDTH: 2.85 CM
LEFT ATRIUM SIZE: 2.56 CM
LEFT ATRIUM VOLUME INDEX MOD: 12.5 ML/M2
LEFT ATRIUM VOLUME INDEX: 17.5 ML/M2
LEFT ATRIUM VOLUME MOD: 19.17 CM3
LEFT ATRIUM VOLUME: 26.77 CM3
LEFT INTERNAL DIMENSION IN SYSTOLE: 2.41 CM (ref 2.1–4)
LEFT VENTRICLE DIASTOLIC VOLUME INDEX: 35.92 ML/M2
LEFT VENTRICLE DIASTOLIC VOLUME: 54.95 ML
LEFT VENTRICLE MASS INDEX: 47 G/M2
LEFT VENTRICLE SYSTOLIC VOLUME INDEX: 13.3 ML/M2
LEFT VENTRICLE SYSTOLIC VOLUME: 20.3 ML
LEFT VENTRICULAR INTERNAL DIMENSION IN DIASTOLE: 3.61 CM (ref 3.5–6)
LEFT VENTRICULAR MASS: 71.18 G
LV LATERAL E/E' RATIO: 8.64 M/S
LV SEPTAL E/E' RATIO: 13.57 M/S
MV PEAK A VEL: 0.46 M/S
MV PEAK E VEL: 0.95 M/S
MV STENOSIS PRESSURE HALF TIME: 85.72 MS
MV VALVE AREA P 1/2 METHOD: 2.57 CM2
OHS QRS DURATION: 122 MS
OHS QTC CALCULATION: 465 MS
PISA TR MAX VEL: 2.04 M/S
PV MEAN GRADIENT: 3 MMHG
PV PEAK GRADIENT: 17
PV PEAK VELOCITY: 2.09 M/S
RA MAJOR: 4.39 CM
RA WIDTH: 3.62 CM
RIGHT VENTRICLE DIASTOLIC BASEL DIMENSION: 3 CM
SINUS: 2.96 CM
STJ: 2.55 CM
TDI LATERAL: 0.11 M/S
TDI SEPTAL: 0.07 M/S
TDI: 0.09 M/S
TR MAX PG: 17 MMHG
TRICUSPID ANNULAR PLANE SYSTOLIC EXCURSION: 1.1 CM
Z-SCORE OF LEFT VENTRICULAR DIMENSION IN END DIASTOLE: -2.12
Z-SCORE OF LEFT VENTRICULAR DIMENSION IN END SYSTOLE: -1.1

## 2024-08-28 PROCEDURE — 93303 ECHO TRANSTHORACIC: CPT

## 2024-08-28 PROCEDURE — 3074F SYST BP LT 130 MM HG: CPT | Mod: CPTII,S$GLB,, | Performed by: PEDIATRICS

## 2024-08-28 PROCEDURE — 93320 DOPPLER ECHO COMPLETE: CPT | Mod: 26,,, | Performed by: PEDIATRICS

## 2024-08-28 PROCEDURE — 3078F DIAST BP <80 MM HG: CPT | Mod: CPTII,S$GLB,, | Performed by: PEDIATRICS

## 2024-08-28 PROCEDURE — 1160F RVW MEDS BY RX/DR IN RCRD: CPT | Mod: CPTII,S$GLB,, | Performed by: PEDIATRICS

## 2024-08-28 PROCEDURE — 1159F MED LIST DOCD IN RCRD: CPT | Mod: CPTII,S$GLB,, | Performed by: PEDIATRICS

## 2024-08-28 PROCEDURE — 93303 ECHO TRANSTHORACIC: CPT | Mod: 26,,, | Performed by: PEDIATRICS

## 2024-08-28 PROCEDURE — 93325 DOPPLER ECHO COLOR FLOW MAPG: CPT | Mod: 26,,, | Performed by: PEDIATRICS

## 2024-08-28 PROCEDURE — 99215 OFFICE O/P EST HI 40 MIN: CPT | Mod: 25,S$GLB,, | Performed by: PEDIATRICS

## 2024-08-28 PROCEDURE — 93005 ELECTROCARDIOGRAM TRACING: CPT | Mod: S$GLB,,, | Performed by: PEDIATRICS

## 2024-08-28 PROCEDURE — 93242 EXT ECG>48HR<7D RECORDING: CPT

## 2024-08-28 PROCEDURE — 99999 PR PBB SHADOW E&M-EST. PATIENT-LVL III: CPT | Mod: PBBFAC,,, | Performed by: PEDIATRICS

## 2024-08-28 PROCEDURE — 3008F BODY MASS INDEX DOCD: CPT | Mod: CPTII,S$GLB,, | Performed by: PEDIATRICS

## 2024-08-28 PROCEDURE — 93010 ELECTROCARDIOGRAM REPORT: CPT | Mod: S$GLB,,, | Performed by: INTERNAL MEDICINE

## 2024-08-28 RX ORDER — NORGESTIMATE AND ETHINYL ESTRADIOL 7DAYSX3 LO
1 KIT ORAL DAILY
COMMUNITY
Start: 2024-05-10

## 2024-08-28 NOTE — PROGRESS NOTES
Subjective:    Patient ID:  Jeremias Cosme is a 23 y.o. female who presents for follow-up of repaired TOF. She has no cardiac symptoms.     She takes aspirin and OCP daily.    PRIOR CARDIAC INTERVENTIONS:   1. Modified right Miguelangel-Taussig shunt, CHNO ([facility])  2. TOF complete repair/ trans-annular patch, [facility]NO ([facility])  3. Right and left branch pulmonary artery stent placement, [facility] (03/21/2005, 09/13/2004).   4. Pulmonary valve replacement using 23 mm Trifecta bovine pericardial valve, branch pulmonary arterioplasty with homograft, main pulmonary arterioplasty with bovine pericardium, partial removal of bilateral pulmonary stents (July 13, 2011) OF ([place])  5. Chandrika valve implant 2015 ([facility])      Review of Systems   Constitutional: Negative.   HENT: Negative.     Eyes: Negative.    Cardiovascular: Negative.    Respiratory: Negative.     Endocrine: Negative.    Hematologic/Lymphatic: Negative.    Skin: Negative.    Musculoskeletal: Negative.    Gastrointestinal: Negative.    Genitourinary: Negative.    Neurological: Negative.    Psychiatric/Behavioral: Negative.     Allergic/Immunologic: Negative.         Objective:    Physical Exam  Constitutional:       General: She is not in acute distress.     Appearance: She is well-developed. She is not diaphoretic.   HENT:      Head: Normocephalic and atraumatic.      Right Ear: External ear normal.      Left Ear: External ear normal.      Nose: Nose normal.      Mouth/Throat:      Pharynx: No oropharyngeal exudate.   Eyes:      General: No scleral icterus.        Right eye: No discharge.         Left eye: No discharge.      Conjunctiva/sclera: Conjunctivae normal.      Pupils: Pupils are equal, round, and reactive to light.   Neck:      Thyroid: No thyromegaly.      Vascular: No JVD.      Trachea: No tracheal deviation.   Cardiovascular:      Rate and Rhythm: Normal rate.      Pulses: Intact distal pulses.           Radial pulses are 2+ on the right side and 2+ on  the left side.        Femoral pulses are 2+ on the right side and 2+ on the left side.     Heart sounds: S1 normal and S2 normal. Murmur heard.      Medium-pitched harsh early systolic murmur is present with a grade of 2/6 at the upper left sternal border.      No friction rub. No gallop.   Pulmonary:      Effort: Pulmonary effort is normal. No respiratory distress.      Breath sounds: Normal breath sounds. No stridor. No wheezing or rales.   Chest:      Chest wall: No tenderness.   Abdominal:      General: Bowel sounds are normal. There is no distension.      Palpations: Abdomen is soft. There is no mass.      Tenderness: There is no abdominal tenderness. There is no guarding or rebound.   Musculoskeletal:         General: No tenderness. Normal range of motion.      Cervical back: Normal range of motion and neck supple.   Lymphadenopathy:      Cervical: No cervical adenopathy.   Skin:     General: Skin is warm and dry.      Coloration: Skin is not pale.      Findings: No erythema or rash.   Neurological:      Mental Status: She is alert and oriented to person, place, and time.      Cranial Nerves: No cranial nerve deficit.      Motor: No abnormal muscle tone.      Coordination: Coordination normal.   Psychiatric:         Behavior: Behavior normal.         Thought Content: Thought content normal.         Judgment: Judgment normal.           ECG: NSR, RBBB  ECHO: Chandrika functioning well, no insufficiency or significant stenosis (16 mmHg peak). Minimal branch gradient unchanged. Mild RVE an low normal biventricular function unchanged.    Assessment:       1. S/P TOF (tetralogy of Fallot) repair    2. Status post pulmonary artery branches stent placement and partial extraction    3. S/P Chandrika in bioprosthetic pulmonary valve replacement    4. RBBB             Plan:       1. I reviewed today's findings in detail including the need for regular exercise and for continued follow up, awareness of the risk for endocarditis  and the eventual need for pulmonary valve re-intervention when the bioprosthetic valve fails. Anticipatory guidance regarding adult issues including pregnancy planning reviewed.  2. Continue aspirin 81 mg po daily and SBE precaution prn.  3. Treat as normal from a cardiac standpoint. Encouraged exercise.  4. Holter today, phone follow up  5. Recheck in one year with ECG, Holter and echo.

## 2025-08-06 ENCOUNTER — TELEPHONE (OUTPATIENT)
Dept: CARDIOLOGY | Facility: CLINIC | Age: 25
End: 2025-08-06
Payer: COMMERCIAL

## 2025-08-06 DIAGNOSIS — Z95.2 S/P PULMONARY VALVE REPLACEMENT: ICD-10-CM

## 2025-08-06 DIAGNOSIS — Z98.890 STATUS POST PULMONARY ARTERY BRANCHES STENT PLACEMENT: ICD-10-CM

## 2025-08-06 DIAGNOSIS — Z87.74 S/P TOF (TETRALOGY OF FALLOT) REPAIR: Primary | ICD-10-CM

## 2025-08-06 DIAGNOSIS — Q21.3 TOF (TETRALOGY OF FALLOT): ICD-10-CM

## 2025-08-06 DIAGNOSIS — Q21.3 TETRALOGY OF FALLOT: ICD-10-CM

## 2025-08-06 DIAGNOSIS — I45.10 RBBB: ICD-10-CM

## 2025-08-06 NOTE — TELEPHONE ENCOUNTER
Offered next available for Sept 24 patient declined. Patient needs appointment after her new insurance take effect in October.  Appt scheduled for Oct 22 start time 11 AM. Patient verbalized understand all information provided.       Copied from CRM #[id number]. Topic: Appointments - Appointment Scheduling  >> Aug 6, 2025 11:48 AM Med Assistant Yary wrote:  Appt Scheduling Request    Caller:Jeremias (pt)    Reason For  Call:Pt is requesting a Call back to get 1 year follow up Scheduling with ECG, Echo and Holter.    [facility] Call Back:[phone]